# Patient Record
Sex: FEMALE | Employment: FULL TIME | URBAN - METROPOLITAN AREA
[De-identification: names, ages, dates, MRNs, and addresses within clinical notes are randomized per-mention and may not be internally consistent; named-entity substitution may affect disease eponyms.]

---

## 2017-09-13 ENCOUNTER — HOSPITAL ENCOUNTER (OUTPATIENT)
Dept: PHYSICAL THERAPY | Age: 47
Discharge: HOME | End: 2017-09-13
Payer: COMMERCIAL

## 2017-09-13 PROCEDURE — 97112 NEUROMUSCULAR REEDUCATION: CPT

## 2017-09-13 PROCEDURE — 97162 PT EVAL MOD COMPLEX 30 MIN: CPT

## 2017-09-13 NOTE — PROGRESS NOTES
PELVIC FLOOR DAILY TREATMENT NOTE 8-    Patient Name: Roger Kirkpatrick  Date:2017  : 1970  [x]  Patient  Verified  Payor: BLUE CROSS / Plan:  [facility] / Product Type: PPO /    In time:8:28  Out time:9:23  Total Treatment Time (min): 55    Visit #: 1 of 8    Treatment Area: Unspecified dyspareunia [N94.10]    SUBJECTIVE  Pain Level (0-10 scale): 0  Any medication changes, allergies to medications, adverse drug reactions, diagnosis change, or new procedure performed?: [x] No    [] Yes (see summary sheet for update)  Subjective functional status/changes:   [] No changes reported  See medical history    OBJECTIVE            10 min Patient Education: [x] Review HEP    [] Progressed/Changed HEP based on: Educated Pt in pelvic floor anatomy, function/dysfunction and correct execution of a pelvic floor contraction. Reviewed biofeedback results. [] positioning   [] body mechanics   [] transfers   [] heat/ice application        Pain Level (0-10 scale) post treatment: 0    ASSESSMENT/Changes in Function: Justification for Eval Code Complexity:  Patient History : chronic, hysteredctomy, back pain  Examination see exam   Clinical Presentation: evolving  Clinical Decision Making : FOTO : 47 /100      Patient will continue to benefit from skilled PT services to modify and progress therapeutic interventions, address strength deficits, analyze and address soft tissue restrictions, instruct in home and community integration and address UUI, constipation to attain remaining goals. [x]  See Plan of Care  []  See progress note/recertification  []  See Discharge Summary         Progress towards goals / Updated goals:  Initial evaluation completed with home exercise program and education initiated.     PLAN  [x]  Upgrade activities as tolerated     []  Continue plan of care  []  Update interventions per flow sheet       []  Discharge due to:_  []  Other:_      Latesha Durham, PT 2017  8:28 AM      No future appointments.

## 2017-09-13 NOTE — PROGRESS NOTES
[address] [facility] PHYSICAL THERAPY AT [address], [address], [address] - Phone: [phone]  Fax: [phone] / [facility]  Patient Name: Sonia Rosenberg : 1970   Medical   Diagnosis: Unspecified dyspareunia [N94.10] Treatment Diagnosis: Unspecified dyspareunia [N94.10]  Urge incontinence (N39.41)  Constipation(K59.00)   Onset Date:      Referral Source: Delroy Jensen MD [facility]): 2017   Prior Hospitalization: See medical history Provider #: [id number]   Prior Level of Function: Chronic symptoms   Comorbidities: Hysterectomy, Back pain, PCOS    Medications: Verified on Patient Summary List   The Plan of Care and following information is based on the information from the initial evaluation.   ==================================================================================  Assessment / key information: Patient is a 41 y.o. yo female G0 who presents to [facility] with diagnosis of Unspecified dyspareunia [N94.10]. Patient reports urge urinary incontinence occurring multiple times daily, inability to delay urination at all with urgency, nocturia 1x nightly and constipation with decreased frequency of bowel movements at 2x weekly with consistent straining. She needs to take laxatives to have a bowel movement. Patient presents to PT with impaired strength of pelvic floor muscles scoring 25/35 on PERF. On biofeedback there was low net rise of fast twitch contraction at 8.56 microvolts and low net rise of slow twitch contraction at 5.58 microvolts. Resting tone of pelvic floor was elevated at 9.18 microvolts. Patient scored 47 on FOTO/Urinary problem indicating decreased quality of life.   Patient can benefit from PT for retraining of muscle control and relaxation on biofeedback to increase pelvic floor muscle strength, decrease urinary incontinence, urgency and nocturia, increase frequency of bowel movements. .    ==================================================================================  Eval Complexity: History: HIGH Complexity :3+ comorbidities / personal factors will impact the outcome/ POC Exam:MEDIUM Complexity : 3 Standardized tests and measures addressing body structure, function, activity limitation and / or participation in recreation  Presentation: MEDIUM Complexity : Evolving with changing characteristics  Clinical Decision Making:MEDIUM Complexity : FOTO score of 26-74Overall Complexity:MEDIUM  Problem List: Pelvic pain/dysfunction, Decreased pelvic floor mm awareness, Decreased pelvic floor mm strength, Hypertonus of pelvic floor, Urinary urgency and Other  Treatment Plan may include any combination of the following: Therapeutic exercise, Urge suppression techniques, Neuromuscular re-education, Manual therapy, Physical agent/modality, Patient education and Other  Patient / Family readiness to learn indicated by: asking questions, trying to perform skills and interest  Persons(s) to be included in education: patient (P)  Barriers to Learning/Limitations: None  Measures taken:    Patient Goal (s): \"Reduced bladder leakage and urgency\"   Patient self reported health status: good  Rehabilitation Potential: good  Short Term Goals: To be accomplished in 4 weeks:   1. Patient performing pelvic floor exercises TID. 2. Patient will report 20% subjective improvement in urinary incontinence with ADLs. 3. Increase net rise of fast twitch contraction to 10 microvolts to increase continence. 4. Patient using urge suppression8 and normal toileting techniques. Long Term Goals: To be accomplished in 8 weeks:   1. Patient independent in HEP     2. Patient will increase sore on FOTO/Urinary problem to 57 indicating improved continence and quality of life. 3. Increase net rise of fast twitch contraction to 12 microvolts to increase continence.      4. Patient reports bowel movement frequency increased to 3x weekly. Frequency / Duration:   Patient to be seen  1  times per week for 8  weeks:  Patient / Caregiver education and instruction:Exercises and Other Toileting techniques  G-Codes (GP): himanshu    Therapist Signature: Pk Lawrence PT Date: 4/81/8770   Certification Period: na Time: 9:27 AM   ==================================================================================  I certify that the above Physical Therapy Services are being furnished while the patient is under my care. I agree with the treatment plan and certify that this therapy is necessary. Physician Signature:        Date:       Time:     Please [facility] and [facility] or you may fax the signed copy to [phone]. Thank you.

## 2017-10-04 ENCOUNTER — HOSPITAL ENCOUNTER (OUTPATIENT)
Dept: PHYSICAL THERAPY | Age: 47
Discharge: HOME | End: 2017-10-04
Payer: COMMERCIAL

## 2017-10-04 PROCEDURE — 97112 NEUROMUSCULAR REEDUCATION: CPT

## 2017-10-04 PROCEDURE — 97140 MANUAL THERAPY 1/> REGIONS: CPT

## 2017-10-04 NOTE — PROGRESS NOTES
PELVIC FLOOR DAILY TREATMENT NOTE 8-14    Patient Name: Beena Valencia  Date:10/4/2017  : 1970  [x]  Patient  Verified  Payor: BLUE CROSS / Plan: [address] [place] [address] [employer] / Product Type: PPO /    In time:7:32  Out time:8:20  Total Treatment Time (min): 48     Visit #: 2 of 8    Treatment Area: Unspecified dyspareunia [N94.10]    SUBJECTIVE  Pain Level (0-10 scale): 0  Any medication changes, allergies to medications, adverse drug reactions, diagnosis change, or new procedure performed?: [x] No    [] Yes (see summary sheet for update)  Subjective functional status/changes:   [] No changes reported  Doing HEP 2x day in sitting. Leaking is better. To have a colonoscopy next week due to continued constipation. OBJECTIVE  Modality rationale: Neuromuscular reeducation to improve the patients ability to have a bowel movement and to decrease urinary incontinence, urgency and nocturia. Min Type Additional Details   38 [x] Biofeedback x 38 minutes    supine vaginal    [] Estim: []Att   []Unatt        []TENS instruct                  []IFC  []Premod   []NMES                     []Other:  []w/US   []w/ice   []w/heat  Position:  Location:    []  Traction: [] Cervical       []Lumbar                       [] Prone          []Supine                       []Intermittent   []Continuous Lbs:  [] before manual  [] after manual    []  Ultrasound: []Continuous   [] Pulsed                           []1MHz   []3MHz Location:  W/cm2:    []  Iontophoresis with dexamethasone         Location: [] Take home patch   [] In clinic    []  Ice     []  heat  []  Ice massage Position:  Location:    []  Vasopneumatic Device Pressure:       [] lo [] med [] hi   Temperature: [] lo [] med [] hi   [x] Skin assessment post-treatment:  [x]intact []redness- no adverse reaction       []redness  adverse reaction:       10 min Manual Therapy: Release/stroking to bilateral pubococcygeus mm with vaginal stretching.    Rationale: Increase tissue extensibility to improve patient's ability to engage in sexual intercourse and undergo a gynecological exam.              min Patient Education: [x] Review HEP    [] Progressed/Changed HEP based on: Increase slow twitch to   [] positioning   [] body mechanics   [] transfers   [] heat/ice application        Other Objective/Functional Measures:    [x]baseline resting tone: 7.0   [x]slow twitch mms 15.4(7.58)   [x]fast twitch mms15.3(6.64)    Pain Level (0-10 scale) post treatment: 0    ASSESSMENT/Changes in Function: Decreased resting tone of pelvic floor muscles with increased net rise of slow twitch contractions. Patient will continue to benefit from skilled PT services to modify and progress therapeutic interventions, address strength deficits, analyze and address soft tissue restrictions, instruct in home and community integration and address UUI, constipation and nocturia. to attain remaining goals. []  See Plan of Care  []  See progress note/recertification  []  See Discharge Summary         Progress towards goals / Updated goals:                            1. Patient performing pelvic floor exercises TID. 2. Patient will report 20% subjective improvement in urinary incontinence with ADLs. 3. Increase net rise of fast twitch contraction to 10 microvolts to increase continence.                            4. Patient using urge suppression8 and normal toileting techniques  PLAN  [x]  Upgrade activities as tolerated     []  Continue plan of care  []  Update interventions per flow sheet       []  Discharge due to:_  []  Other:_      Latricia Briggs PT 10/4/2017  7:32 AM      Future Appointments  Date Time Provider Checo Casillas   10/13/2017 7:30 AM Latricia Briggs PT [facility]   10/20/2017 7:30 AM Latricia Briggs PT [facility]   10/27/2017 7:30 AM Latricia Briggs PT [facility]   11/3/2017 7:30 AM Latricia Briggs PT [facility]   11/10/2017 7:30 DAIJA Blum, PT [facility]   11/17/2017 7:30 DAIJA Blum, PT [facility]

## 2017-10-06 ENCOUNTER — APPOINTMENT (OUTPATIENT)
Dept: PHYSICAL THERAPY | Age: 47
End: 2017-10-06
Payer: COMMERCIAL

## 2017-10-13 ENCOUNTER — HOSPITAL ENCOUNTER (OUTPATIENT)
Dept: PHYSICAL THERAPY | Age: 47
Discharge: HOME | End: 2017-10-13
Payer: COMMERCIAL

## 2017-10-13 PROCEDURE — 97140 MANUAL THERAPY 1/> REGIONS: CPT

## 2017-10-13 PROCEDURE — 97110 THERAPEUTIC EXERCISES: CPT

## 2017-10-13 PROCEDURE — 97112 NEUROMUSCULAR REEDUCATION: CPT

## 2017-10-13 NOTE — PROGRESS NOTES
[address] PHYSICAL THERAPY AT [facility] [address], [address] [facility], [place] [address] - Phone: [phone]  Fax: [phone]  PROGRESS NOTE  Patient Name: Amrit Hilario : 1970   Treatment/Medical Diagnosis: Unspecified dyspareunia [N94.10]   Referral Source: Liya Beard MD     Date of Initial Visit: 2 Attended Visits: 3 Missed Visits: 0   SUMMARY OF TREATMENT  PT has consisted of pelvic floor relaxation/strengthening via biofeedback, education as to  pelvic floor anatomy and function , manual therapy and home exercise program.   CURRENT STATUS  Patient has made good progress in PT with short term goals either met or progressing. Functional progress  Includes patient reporting 60% improvement in urinary leaking and urgency. Patient has been slow to strengthen the pelvic floor. Goal/Measure of Progress Goal Met? 1. Patient performing pelvic floor exercises 3x day   Status at last Eval: na Current Status: 3x day yes   2. Patient will report 20% improvement in urinary incontinence with ADLS. Status at last Eval: na Current Status: 60% yes   3. Increase net rise of fast twitch contraction to 10 microvolts to increase continence. 4. Patient using urge suppression8 and normal toileting techniques   Status at last Eval: 8.56  na Current Status: 9.13  Ongoing Progressing  progressing   New Goals to be achieved in __4__  weeks:                           1. Patient independent in HEP                             2. Patient will increase sore on FOTO/Urinary problem to 57 indicating improved continence and quality of life. 3. Increase net rise of fast twitch contraction to 11 microvolts to increase continence. 4. Patient reports bowel movement frequency increased to 3x weekly  RECOMMENDATIONS  Continue pelvic floor PT 1x week for 4 weeks.    If you have any questions/comments please contact us directly at [phone]. Thank you for allowing us to assist in the care of your patient. Therapist Signature: Justen Cortes PT Date: 10/13/2017     Time: 7:33 AM   NOTE TO PHYSICIAN:  PLEASE COMPLETE THE ORDERS BELOW AND FAX TO   [facility]: [phone]. If you are unable to process this request in 24 hours please contact our office: [phone].    ___ I have read the above report and request that my patient continue as recommended.   ___ I have read the above report and request that my patient continue therapy with the following changes/special instructions:_________________________________________________________   ___ I have read the above report and request that my patient be discharged from therapy.      Physician Signature:        Date:       Time:

## 2017-10-13 NOTE — PROGRESS NOTES
PELVIC FLOOR DAILY TREATMENT NOTE 8-14    Patient Name: Ольга Judd  Date:10/13/2017  : 1970  [x]  Patient  Verified  Payor: BLUE CROSS / Plan: [facility] / Product Type: PPO /    In time:7:37  Out time:8:15  Total Treatment Time (min): 38    Visit #: 3 of 8    Treatment Area: Unspecified dyspareunia [N94.10]    SUBJECTIVE  Pain Level (0-10 scale): 0  Any medication changes, allergies to medications, adverse drug reactions, diagnosis change, or new procedure performed?: [x] No    [] Yes (see summary sheet for update)  Subjective functional status/changes:   [] No changes reported  Had colonoscopy. Small polyp and hemmorhoids. HEP 3x day most days. 60% improved re UI and urgency.     OBJECTIVE  Modality rationale: Neuromuscular reeducation to improve the patients ability to have a bowel movement and to decrease urinary incontinence, urgency and nocturia   Min Type Additional Details   18 [x] Biofeedback x 18 minutes    supine vaginal    [] Estim: []Att   []Unatt        []TENS instruct                  []IFC  []Premod   []NMES                     []Other:  []w/US   []w/ice   []w/heat  Position:  Location:    []  Traction: [] Cervical       []Lumbar                       [] Prone          []Supine                       []Intermittent   []Continuous Lbs:  [] before manual  [] after manual    []  Ultrasound: []Continuous   [] Pulsed                           []1MHz   []3MHz Location:  W/cm2:    []  Iontophoresis with dexamethasone         Location: [] Take home patch   [] In clinic    []  Ice     []  heat  []  Ice massage Position:  Location:    []  Vasopneumatic Device Pressure:       [] lo [] med [] hi   Temperature: [] lo [] med [] hi   [x] Skin assessment post-treatment:  [x]intact []redness- no adverse reaction       []redness  adverse reaction:     10 min Therapeutic Exercise:  [] See flow sheet :  []  Pelvic floor strengthening                []  Pelvic floor downtraining  [] Quality pelvic floor contractions      []  Relaxation techniques  [x]  Urge suppression exercises  []  Other:    Rationale: To improve the patients ability to delay urination. 10 min Manual Therapy: Release/stroking to bilateral pubococcygeus mm and R illiococcygeus with vaginal stretching. Rationale: Increase tissue extensibility to improve patient's ability to engage in sexual intercourse and undergo a gynecological exam.                  min Patient Education: [x] Review HEP    [] Progressed/Changed HEP based on:   [] positioning   [] body mechanics   [] transfers   [] heat/ice application        Other Objective/Functional Measures:    [x]baseline resting tone: 3.9   [x]slow twitch mms 17. 9(9.44)   [x]fast twitch mms18.7(9.13)    Pain Level (0-10 scale) post treatment: 0    ASSESSMENT/Changes in Function: Patient demonstrates improved resting tone of pelvic floor  And increased net rise of both fast and slow contractions muscles with improved continence. Patient will continue to benefit from skilled PT services to modify and progress therapeutic interventions, address strength deficits, analyze and address soft tissue restrictions, instruct in home and community integration and address UUI, constipation and nocturia.  to attain remaining goals.         []  See Plan of Care  [x]  See progress note/recertification  []  See Discharge Summary         Progress towards goals / Updated goals:  See PN    PLAN  [x]  Upgrade activities as tolerated     []  Continue plan of care  []  Update interventions per flow sheet       []  Discharge due to:_  []  Other:_      Latricia Briggs PT 10/13/2017  7:37 AM      Future Appointments  Date Time Provider Checo Casillas   10/20/2017 7:30 AM Latricia Briggs PT [facility]   10/27/2017 7:30 AM Latricai Briggs PT [facility]   11/3/2017 7:30 AM Latricia Briggs PT [facility]   11/10/2017 7:30 AM Latricia Briggs PT [facility]   11/17/2017 7:30 AM Latricia Briggs, PT [facility]

## 2017-10-20 ENCOUNTER — HOSPITAL ENCOUNTER (OUTPATIENT)
Dept: PHYSICAL THERAPY | Age: 47
Discharge: HOME | End: 2017-10-20
Payer: COMMERCIAL

## 2017-10-20 PROCEDURE — 97112 NEUROMUSCULAR REEDUCATION: CPT

## 2017-10-20 PROCEDURE — 97140 MANUAL THERAPY 1/> REGIONS: CPT

## 2017-10-20 PROCEDURE — 97110 THERAPEUTIC EXERCISES: CPT

## 2017-10-20 NOTE — PROGRESS NOTES
PELVIC FLOOR DAILY TREATMENT NOTE 8-    Patient Name: Amrit Hilario  Date:10/20/2017  : 1970  [x]  Patient  Verified  Payor: BLUE CROSS / Plan: [facility] / Product Type: PPO /    In time:7:35  Out time:8:19  Total Treatment Time (min): 44    Visit #: 4 of 8    Treatment Area: Unspecified dyspareunia [N94.10]    SUBJECTIVE  Pain Level (0-10 scale): 0  Any medication changes, allergies to medications, adverse drug reactions, diagnosis change, or new procedure performed?: [x] No    [] Yes (see summary sheet for update)  Subjective functional status/changes:   [] No changes reported  Not feeling muscles like she did the week before. Went down on estradiol cream.  Hasn't had much urgency. Hasn't been getting up as much at night.   Having intermittent LBP  OBJECTIVE  Modality rationale: Neuromuscular reeducation to improve the patients ability to have a bowel movement and to decrease urinary incontinence, urgency and nocturia   Min Type Additional Details   24 [x] Biofeedback x 24 minutes    supine vaginal    [] Estim: []Att   []Unatt        []TENS instruct                  []IFC  []Premod   []NMES                     []Other:  []w/US   []w/ice   []w/heat  Position:  Location:    []  Traction: [] Cervical       []Lumbar                       [] Prone          []Supine                       []Intermittent   []Continuous Lbs:  [] before manual  [] after manual    []  Ultrasound: []Continuous   [] Pulsed                           []1MHz   []3MHz Location:  W/cm2:    []  Iontophoresis with dexamethasone         Location: [] Take home patch   [] In clinic    []  Ice     []  heat  []  Ice massage Position:  Location:    []  Vasopneumatic Device Pressure:       [] lo [] med [] hi   Temperature: [] lo [] med [] hi   [x] Skin assessment post-treatment:  [x]intact []redness- no adverse reaction       []redness  adverse reaction:     10 min Therapeutic Exercise:  [x] See flow sheet :  []  Pelvic floor strengthening                []  Pelvic floor downtraining  []  Quality pelvic floor contractions      []  Relaxation techniques  []  Urge suppression exercises  [x]  Other:  Core strengthening    Rationale: increase strength of accessory muscles to improve the patients continence. 10 min Manual Therapy: Thieles massage rectally. No tenderness but severe weakness with EAS 1/5, puborectalis 0/5. Mliss Chand Rationale: Increase tissue extensibility to improve patient's ability to have a bowel movement.              min Patient Education: [x] Review HEP    [] Progressed/Changed HEP based on: Core exercises 3x week  [] positioning   [] body mechanics   [] transfers   [] heat/ice application        Other Objective/Functional Measures:    [x]baseline resting tone: 5   [x]slow twitch mms 19.6(10.3)   [x]fast twitch mms19.9(9.15)    Pain Level (0-10 scale) post treatment: 0    ASSESSMENT/Changes in Function: Severe pelvic floor weakness rectally. Patient will continue to benefit from skilled PT services to modify and progress therapeutic interventions, address strength deficits, analyze and address soft tissue restrictions, instruct in home and community integration and address UUI, constipation and nocturia. to attain remaining goals.         []  See Plan of Care  []  See progress note/recertification  []  See Discharge Summary         Progress towards goals / Updated goals:                          1. Patient independent in HEP. progressing                             2. Patient will increase sore on FOTO/Urinary problem to 57 indicating improved continence and quality                          of life.                           3.  Increase net rise of fast twitch contraction to 11 microvolts to increase continence.                             4. Patient reports bowel movement frequency increased to 3x weekly    PLAN  [x]  Upgrade activities as tolerated     []  Continue plan of care  []  Update interventions per flow sheet       []  Discharge due to:_  []  Other:_      Marco Kovacs, PT 10/20/2017  7:38 AM      Future Appointments  Date Time Provider Checo Casillas   10/27/2017 7:30 AM Marco Kovacs, PT [facility]   11/3/2017 7:30 AM Marco Kovacs, PT [facility]   11/10/2017 7:30 AM Marco Kovacs, PT [facility]   11/17/2017 7:30 AM Marco Kovacs, PT [facility]

## 2017-10-27 ENCOUNTER — APPOINTMENT (OUTPATIENT)
Dept: PHYSICAL THERAPY | Age: 47
End: 2017-10-27
Payer: COMMERCIAL

## 2017-11-03 ENCOUNTER — HOSPITAL ENCOUNTER (OUTPATIENT)
Dept: PHYSICAL THERAPY | Age: 47
Discharge: HOME | End: 2017-11-03
Payer: COMMERCIAL

## 2017-11-03 PROCEDURE — 97014 ELECTRIC STIMULATION THERAPY: CPT

## 2017-11-03 PROCEDURE — 97112 NEUROMUSCULAR REEDUCATION: CPT

## 2017-11-03 PROCEDURE — 97110 THERAPEUTIC EXERCISES: CPT

## 2017-11-03 NOTE — PROGRESS NOTES
PELVIC FLOOR DAILY TREATMENT NOTE 8-    Patient Name: Anthony Blas  Date:11/3/2017  : 1970  [x]  Patient  Verified  Payor: BLUE CROSS / Plan: [employer] [facility] / Product Type: PPO /    In time:7:45  Out time:8:30  Total Treatment Time (min): 45    Visit #: 5 of 8    Treatment Area: Unspecified dyspareunia [N94.10]    SUBJECTIVE  Pain Level (0-10 scale): 0  Any medication changes, allergies to medications, adverse drug reactions, diagnosis change, or new procedure performed?: [x] No    [] Yes (see summary sheet for update)  Subjective functional status/changes:   [] No changes reported  Not as much leaking but still has urgency when at work in the afternoon. Goes every hour in afternoon. Trying to do urge suppression.     OBJECTIVE  Modality rationale: Neuromuscular reeducation to improve the patients ability to have a bowel movement and to decrease urinary incontinence, urgency and nocturia   Min Type Additional Details   25 [x] Biofeedback x 25 minutes    sit surface   10 [x] Estim: []Att   []Unatt        []TENS instruct                  []IFC  []Premod   [x]NMES 50 Hz 5/10                    []Other:  []w/US   []w/ice   []w/heat  Position:  Location:    []  Traction: [] Cervical       []Lumbar                       [] Prone          []Supine                       []Intermittent   []Continuous Lbs:  [] before manual  [] after manual    []  Ultrasound: []Continuous   [] Pulsed                           []1MHz   []3MHz Location:  W/cm2:    []  Iontophoresis with dexamethasone         Location: [] Take home patch   [] In clinic    []  Ice     []  heat  []  Ice massage Position:  Location:    []  Vasopneumatic Device Pressure:       [] lo [] med [] hi   Temperature: [] lo [] med [] hi   [x] Skin assessment post-treatment:  [x]intact []redness- no adverse reaction       []redness  adverse reaction:   10 min Therapeutic Exercise:  [x] See flow sheet :  []  Pelvic floor strengthening []  Pelvic floor downtraining  []  Quality pelvic floor contractions      []  Relaxation techniques  []  Urge suppression exercises  [x]  Other:  Core strengthening                         Rationale: increase strength of accessory muscles to improve the patients continence             min Patient Education: [x] Review HEP    [] Progressed/Changed HEP based on: Continue urge suppression techniques trying to delay to second urge. Add side leg circles and prone SLR. [] positioning   [] body mechanics   [] transfers   [] heat/ice application        Other Objective/Functional Measures:    [x]baseline resting tone: -   [x]slow twitch mms 10. 1(6.27)   [x]fast twitch mms15.2(9.09)    Pain Level (0-10 scale) post treatment: 0    ASSESSMENT/Changes in Function: Improved continence but continued urgency and decreased PF muscle strength. Added PF NMES secondary to patient slow to strengthen. Patient will continue to benefit from skilled PT services to modify and progress therapeutic interventions, address strength deficits, analyze and address soft tissue restrictions, instruct in home and community integration and address UUI, constipation and nocturia. to attain remaining goals. [id number]  []  See Plan of Care  []  See progress note/recertification  []  See Discharge Summary      Progress towards goals / Updated goals:                          1. Patient independent in HEP. progressing                             2. Patient will increase sore on FOTO/Urinary problem to 57 indicating improved continence and quality                          of life.                           3.  Increase net rise of fast twitch contraction to 11 microvolts to increase continence.  not met                           4. Patient reports bowel movement frequency increased to 3x weekly    PLAN  [x]  Upgrade activities as tolerated     []  Continue plan of care  []  Update interventions per flow sheet       []  Discharge due to:_  []  Other:_ Yasmany Grande, PT 11/3/2017  7:47 AM      Future Appointments  Date Time Provider Checo Casillas   11/10/2017 2:15 PM Yasmany Grande, PT [facility]   11/17/2017 7:30 AM Yasmany Grande, PT [facility]   11/30/2017 5:45 PM Yasmany Grande, PT [facility]

## 2017-11-08 ENCOUNTER — HOSPITAL ENCOUNTER (OUTPATIENT)
Dept: PHYSICAL THERAPY | Age: 47
Discharge: HOME | End: 2017-11-08
Payer: COMMERCIAL

## 2017-11-08 PROCEDURE — 97014 ELECTRIC STIMULATION THERAPY: CPT

## 2017-11-08 PROCEDURE — 97110 THERAPEUTIC EXERCISES: CPT

## 2017-11-08 PROCEDURE — 97112 NEUROMUSCULAR REEDUCATION: CPT

## 2017-11-08 NOTE — PROGRESS NOTES
PELVIC FLOOR DAILY TREATMENT NOTE     Patient Name: Sharon Dus  Date:2017  : 1970  [x]  Patient  Verified  Payor: BLUE CROSS / Plan: [facility] / Product Type: PPO /    In time:8:17  Out time:9:17  Total Treatment Time (min): 50    Visit #: 6 of 8    Treatment Area: Unspecified dyspareunia [N94.10]    SUBJECTIVE  Pain Level (0-10 scale): 0  Any medication changes, allergies to medications, adverse drug reactions, diagnosis change, or new procedure performed?: [x] No    [] Yes (see summary sheet for update)  Subjective functional status/changes:   [] No changes reported  Able to delay urination up to 30 minutes while at work. Bowel movements every 3 days but no urge to go. .      OBJECTIVE  Modality rationale: Neuromuscular reeducation to improve the patients ability to have a bowel movement and to decrease urinary incontinence, urgency and nocturia   Min Type Additional Details   30 [x] Biofeedback x 30 minutes    sit surface and toileting techniques    10 [x] Estim: []Att   [x]Unatt        []TENS instruct                  []IFC  []Premod   [x]NMES 50 Hz 5/10                    []Other:  []w/US   []w/ice   []w/heat  Position: supine  Location: vaginal    []  Traction: [] Cervical       []Lumbar                       [] Prone          []Supine                       []Intermittent   []Continuous Lbs:  [] before manual  [] after manual    []  Ultrasound: []Continuous   [] Pulsed                           []1MHz   []3MHz Location:  W/cm2:    []  Iontophoresis with dexamethasone         Location: [] Take home patch   [] In clinic    []  Ice     []  heat  []  Ice massage Position:  Location:    []  Vasopneumatic Device Pressure:       [] lo [] med [] hi   Temperature: [] lo [] med [] hi   [x] Skin assessment post-treatment:  [x]intact []redness- no adverse reaction       []redness  adverse reaction:   10 min Therapeutic Exercise:  [x] See flow sheet :  []  Pelvic floor strengthening                []  XCJTIF floor downtraining  []  Quality pelvic floor contractions      [x]  Relaxation techniques  []  Urge suppression exercises  []  Other:                          Rationale: Improve ease of bowel movements and frequency.            min Patient Education: [x] Review HEP    [] Progressed/Changed HEP based on: Add abdominal breathing. [] positioning   [] body mechanics   [] transfers   [] heat/ice application        Other Objective/Functional Measures:    []baseline resting tone: -   [x]slow twitch mms 15.2(11.1)   [x]fast twitch mms14.8(7.94)    Pain Level (0-10 scale) post treatment: 0    ASSESSMENT/Changes in Function: Improved ability to delay to second urge    Patient will continue to benefit from skilled PT services to modify and progress therapeutic interventions, address strength deficits, analyze and address soft tissue restrictions, instruct in home and community integration and address UUI, constipation and nocturia. to attain remaining goals. Mason Martin  []  See Plan of Care  []  See progress note/recertification  []  See Discharge Summary      Progress towards goals / Updated goals:                          1. Patient independent in [address].  progressing                             2. Patient will increase sore on FOTO/Urinary problem to 57 indicating improved continence and quality                          of life.                           3.  Increase net rise of fast twitch contraction to 11 microvolts to increase continence.  not met                           4. Patient reports bowel movement frequency increased to 3x weekly     PLAN  [x]  Upgrade activities as tolerated     []  Continue plan of care  []  Update interventions per flow sheet       []  Discharge due to:_  []  Other:_      Carmen Campos PT 11/8/2017  8:19 AM      Future Appointments  Date Time Provider Checo Casillas   11/17/2017 7:30 AM Carmen Campos PT [facility]   11/30/2017 5:45 PM CONNIE Jones [facility]

## 2017-11-10 ENCOUNTER — APPOINTMENT (OUTPATIENT)
Dept: PHYSICAL THERAPY | Age: 47
End: 2017-11-10
Payer: COMMERCIAL

## 2017-11-17 ENCOUNTER — HOSPITAL ENCOUNTER (OUTPATIENT)
Dept: PHYSICAL THERAPY | Age: 47
Discharge: HOME | End: 2017-11-17
Payer: COMMERCIAL

## 2017-11-17 PROCEDURE — 97014 ELECTRIC STIMULATION THERAPY: CPT

## 2017-11-17 PROCEDURE — 97112 NEUROMUSCULAR REEDUCATION: CPT

## 2017-11-17 NOTE — PROGRESS NOTES
PELVIC FLOOR DAILY TREATMENT NOTE     Patient Name: Shaye Bowden  Date:2017  : 1970  [x]  Patient  Verified  Payor: BLUE CROSS / Plan: [employer] [facility] / Product Type: PPO /    In time:7:34  Out time:8:22  Total Treatment Time (min): 48    Visit #: 7 of     Treatment Area: Unspecified dyspareunia [N94.10]    SUBJECTIVE  Pain Level (0-10 scale): 0  Any medication changes, allergies to medications, adverse drug reactions, diagnosis change, or new procedure performed?: [x] No    [] Yes (see summary sheet for update)  Subjective functional status/changes:   [] No changes reported  Bowel movements every 3-4 days but needs to take magnesium. Leaking better but not a good week this past week. Not getting up at night. 70-75% improved. Still  going frequently in PM at work, every hour. Only 1x in AM at work. .     OBJECTIVE  Modality rationale: Neuromuscular reeducation to improve the patients ability to have a bowel movement and to decrease urinary incontinence, urgency and nocturia   Min Type Additional Details   38 [x] Biofeedback x 38 minutes    sit and stand surface   10 [x] Estim: []Att   [x]Unatt        []TENS instruct                  []IFC  []Premod   [x]NMES 50 Hz 5/10                    []Other:  []w/US   []w/ice   []w/heat  Position:  Location:    []  Traction: [] Cervical       []Lumbar                       [] Prone          []Supine                       []Intermittent   []Continuous Lbs:  [] before manual  [] after manual    []  Ultrasound: []Continuous   [] Pulsed                           []1MHz   []3MHz Location:  W/cm2:    []  Iontophoresis with dexamethasone         Location: [] Take home patch   [] In clinic    []  Ice     []  heat  []  Ice massage Position:  Location:    []  Vasopneumatic Device Pressure:       [] lo [] med [] hi   Temperature: [] lo [] med [] hi   [x] Skin assessment post-treatment:  [x]intact []redness- no adverse reaction       []redness  adverse reaction:   10 min Therapeutic Exercise:  [x] See flow sheet :  []  Pelvic floor strengthening                []  Pelvic floor downtraining  []  Quality pelvic floor contractions      []  Relaxation techniques  []  Urge suppression exercises  [x]  Other:  Core strengthening                         Rationale: increase strength of accessory muscles to improve the patients continence              min Patient Education: [x] Review HEP    [] Progressed/Changed HEP based on:   [] positioning   [] body mechanics   [] transfers   [] heat/ice application        Other Objective/Functional Measures:    []baseline resting tone: -   [x]slow twitch mms 12(8.62)   [x]fast twitch mms14.6(9.33)    Pain Level (0-10 scale) post treatment: 0    ASSESSMENT/Changes in Function: See PN    Patient will continue to benefit from skilled PT services to modify and progress therapeutic interventions, address strength deficits, analyze and address soft tissue restrictions, instruct in home and community integration and address UUI, constipation and nocturia. to attain remaining goals.         []  See Plan of Care  [x]  See progress note/recertification  []  See Discharge Summary         Progress towards goals / Updated goals:  See PN    PLAN  [x]  Upgrade activities as tolerated     []  Continue plan of care  []  Update interventions per flow sheet       []  Discharge due to:_  []  Other:_      Latricia Briggs, PT 11/17/2017  7:34 AM      Future Appointments  Date Time Provider Checo Casillas   11/30/2017 5:45 PM Latricia Briggs, PT [facility]

## 2017-11-17 NOTE — PROGRESS NOTES
[address] PHYSICAL THERAPY AT [place]  [address] [facility] [place] [address] - Phone: [phone]  Fax: [phone]  PROGRESS NOTE  Patient Name: Vicenta Cook : 1970   Treatment/Medical Diagnosis: Unspecified dyspareunia [N94.10]   Referral Source: Diana Manzo MD     Date of Initial Visit: 2017 Attended Visits: 7 Missed Visits: 0   SUMMARY OF TREATMENT  PT has consisted of pelvic floor relaxation/strengthening via biofeedback, education as to  pelvic floor anatomy and function/toileting techniques , manual therapy and home exercise program.   CURRENT STATUS  Patient has made good progress in PT with long term goals partially met. Functional progress  Includes patient reporting 70-75%% improvement in urinary incontinence. She no longer has nocturiaPelvic floor muscle strength still impaired. Patient can benefit from continuing PT due to continued urgency and increased frequency of urination. Blanquita Underwood of Progress Goal Met? 1. Patient independent in HEP     Status at last Eval: progressing Current Status: progressinf progressing   2. Patient will increase sore on FOTO/Urinary problem to 57 indicating improved continence and quality of life. Status at last Eval: 47 Current Status: 58 yes   3. Increase net rise of fast twitch contraction to 11 microvolts to increase continence.     4. Patient reports bowel movement frequency increased to 3x weekly   Status at last Eval: 9.13  2x weekly Current Status: 9.33  2x weekly Yes  no   New Goals to be achieved in __4__  weeks:  1. Patient independent in home exercise program.   2.  Patient will report bowel movement frequency increased to 3x weekly. 3.  Increase net rise of fast twitch contraction to 11 microvolts to increase continence. G-Codes: na  RECOMMENDATIONS  Continue pelvic floor PT 1x week for 4 weeks.    If you have any questions/comments please contact us directly at [phone]. Thank you for allowing us to assist in the care of your patient. Therapist Signature: Angelica Drake PT Date: 11/17/2017     Time: 7:39 AM   NOTE TO PHYSICIAN:  PLEASE COMPLETE THE ORDERS BELOW AND FAX TO   [facility]: [phone]. If you are unable to process this request in 24 hours please contact our office: [phone].    ___ I have read the above report and request that my patient continue as recommended.   ___ I have read the above report and request that my patient continue therapy with the following changes/special instructions:_________________________________________________________   ___ I have read the above report and request that my patient be discharged from therapy.      Physician Signature:        Date:       Time:

## 2017-11-30 ENCOUNTER — APPOINTMENT (OUTPATIENT)
Dept: PHYSICAL THERAPY | Age: 47
End: 2017-11-30
Payer: COMMERCIAL

## 2017-12-01 ENCOUNTER — HOSPITAL ENCOUNTER (OUTPATIENT)
Dept: PHYSICAL THERAPY | Age: 47
Discharge: HOME | End: 2017-12-01
Payer: COMMERCIAL

## 2017-12-01 PROCEDURE — 97112 NEUROMUSCULAR REEDUCATION: CPT

## 2017-12-01 PROCEDURE — 97014 ELECTRIC STIMULATION THERAPY: CPT

## 2017-12-01 NOTE — PROGRESS NOTES
PELVIC FLOOR DAILY TREATMENT NOTE 8-14    Patient Name: Libby Painter  Date:2017  : 1970  [x]  Patient  Verified  Payor: BLUE CROSS / Plan: [employer] [facility] / Product Type: PPO /    In time:7:35  Out time:8:22  Total Treatment Time (min): 47    Visit #: 8 of 11    Treatment Area: Unspecified dyspareunia [N94.10]    SUBJECTIVE  Pain Level (0-10 scale): 0  Any medication changes, allergies to medications, adverse drug reactions, diagnosis change, or new procedure performed?: [x] No    [] Yes (see summary sheet for update)  Subjective functional status/changes:   [] No changes reported  Patient reports having 2 bowel movements this past week. Restarted Estrace. Had stopped it secondary to hair falling out but feels that it does help her muscles. Eats very little fruits and vegetables. 1 piece of fruit daily which is a banana.   OBJECTIVE  Modality rationale: Neuromuscular reeducation to improve the patients ability to have a bowel movement and to decrease urinary incontinence, urgency and nocturia   Min Type Additional Details   37 [x] Biofeedback x 37 minutes    sit surface and vaginal supine   10 [x] Estim: []Att   [x]Unatt        []TENS instruct                  []IFC  []Premod   [x]NMES 50 Hz 5/10                    []Other:  []w/US   []w/ice   []w/heat  Position: supine  Location: vaginal    []  Traction: [] Cervical       []Lumbar                       [] Prone          []Supine                       []Intermittent   []Continuous Lbs:  [] before manual  [] after manual    []  Ultrasound: []Continuous   [] Pulsed                           []1MHz   []3MHz Location:  W/cm2:    []  Iontophoresis with dexamethasone         Location: [] Take home patch   [] In clinic    []  Ice     []  heat  []  Ice massage Position:  Location:    []  Vasopneumatic Device Pressure:       [] lo [] med [] hi   Temperature: [] lo [] med [] hi   [x] Skin assessment post-treatment:  [x]intact []redness- no adverse reaction       []redness  adverse reaction:            min Patient Education: [x] Review HEP    [] Progressed/Changed HEP based on: Have 1 apple a day. [] positioning   [] body mechanics   [] transfers   [] heat/ice application        Other Objective/Functional Measures:    []baseline resting tone: -   [x]slow twitch mms 3.55(3.02)   [x]fast twitch mms9.01(5.87)    Pain Level (0-10 scale) post treatment: 0    ASSESSMENT/Changes in Function: Decreased pelvic floor muscle strength. Patient will continue to benefit from skilled PT services to modify and progress therapeutic interventions, address strength deficits, analyze and address soft tissue restrictions, instruct in home and community integration and address UUI, constipation and nocturia. to attain remaining goals.         []  See Plan of Care  []  See progress note/recertification  []  See Discharge Summary         Progress towards goals / Updated goals:    1. Patient independent in home exercise program.   2.  Patient will report bowel movement frequency increased to 3x weekly.    3.  Increase net rise of fast twitch contraction to 11 microvolts to increase continence     PLAN  [x]  Upgrade activities as tolerated     []  Continue plan of care  []  Update interventions per flow sheet       []  Discharge due to:_  []  Other:_      Meri Person, PT 12/1/2017  7:34 AM      Future Appointments  Date Time Provider Checo Casillas   12/8/2017 7:30 AM Meri Person PT [facility]   12/15/2017 7:30 AM Meri Person, PT [facility]   12/20/2017 7:30 AM Meri Person PT [facility]

## 2017-12-08 ENCOUNTER — APPOINTMENT (OUTPATIENT)
Dept: PHYSICAL THERAPY | Age: 47
End: 2017-12-08
Payer: COMMERCIAL

## 2017-12-15 ENCOUNTER — HOSPITAL ENCOUNTER (OUTPATIENT)
Dept: PHYSICAL THERAPY | Age: 47
Discharge: HOME | End: 2017-12-15
Payer: COMMERCIAL

## 2017-12-15 PROCEDURE — 97014 ELECTRIC STIMULATION THERAPY: CPT

## 2017-12-15 PROCEDURE — 97112 NEUROMUSCULAR REEDUCATION: CPT

## 2017-12-15 NOTE — PROGRESS NOTES
PELVIC FLOOR DAILY TREATMENT NOTE 8-14    Patient Name: Latricia Cordero  Date:12/15/2017  : 1970  [x]  Patient  Verified  Payor: BLUE CROSS / Plan: [employer] [facility] [place] / Product Type: PPO /    In time:7:42  Out time:8:30  Total Treatment Time (min): 48    Visit #: 9 of 11    Treatment Area: Unspecified dyspareunia [N94.10]    SUBJECTIVE  Pain Level (0-10 scale): 0  Any medication changes, allergies to medications, adverse drug reactions, diagnosis change, or new procedure performed?: [x] No    [] Yes (see summary sheet for update)  Subjective functional status/changes:   [] No changes reported  Patient reports less leaking but still has urgency. Bowel movements daily secondary to glucophage. Has a biofeedback machine at home but isn't sure where it is.     OBJECTIVE  Modality rationale: Neuromuscular reeducation to improve the patients ability to have a bowel movement and to decrease urinary incontinence, urgency and nocturia   Min Type Additional Details   38 [] Biofeedback x 38 minutes    sit and stand surface   10 [x] Estim: []Att   [x]Unatt        []TENS instruct                  []IFC  []Premod   [x]NMES  50 Hz 5/10                   []Other:  []w/US   []w/ice   []w/heat  Position: supine  Location: vaginal    []  Traction: [] Cervical       []Lumbar                       [] Prone          []Supine                       []Intermittent   []Continuous Lbs:  [] before manual  [] after manual    []  Ultrasound: []Continuous   [] Pulsed                           []1MHz   []3MHz Location:  W/cm2:    []  Iontophoresis with dexamethasone         Location: [] Take home patch   [] In clinic    []  Ice     []  heat  []  Ice massage Position:  Location:    []  Vasopneumatic Device Pressure:       [] lo [] med [] hi   Temperature: [] lo [] med [] hi   [x] Skin assessment post-treatment:  [x]intact []redness- no adverse reaction             min Patient Education: [x] Review HEP    [] Progressed/Changed HEP based on:   [] positioning   [] body mechanics   [] transfers   [] heat/ice application        Other Objective/Functional Measures: PF contraction 3/5   []baseline resting tone: -   [x]slow twitch mms 11.6((9.54)   [x]fast twitch mms15(9.45)    Pain Level (0-10 scale) post treatment: 0    ASSESSMENT/Changes in Function: Slowly improving PF muscle strength with improving continence. Patient will continue to benefit from skilled PT services to modify and progress therapeutic interventions, address strength deficits, analyze and address soft tissue restrictions, instruct in home and community integration and address UUI, constipation and nocturia. to attain remaining goals.         []  See Plan of Care  []  See progress note/recertification  []  See Discharge Summary         Progress towards goals / Updated goals:  1.  Patient independent in home exercise program.  progressing   2.  Patient will report bowel movement frequency increased to 3x weekly. met   3.  Increase net rise of fast twitch contraction to 11 microvolts to increase continence.   progressing         PLAN  [x]  Upgrade activities as tolerated     []  Continue plan of care  []  Update interventions per flow sheet       []  Discharge due to:_  []  Other:_      Balbir Aceves, PT 12/15/2017  7:38 AM      Future Appointments  Date Time Provider Checo Casillas   12/20/2017 7:30 AM Balbir Aceves PT [facility]

## 2017-12-20 ENCOUNTER — APPOINTMENT (OUTPATIENT)
Dept: PHYSICAL THERAPY | Age: 47
End: 2017-12-20
Payer: COMMERCIAL

## 2017-12-22 ENCOUNTER — APPOINTMENT (OUTPATIENT)
Dept: PHYSICAL THERAPY | Age: 47
End: 2017-12-22
Payer: COMMERCIAL

## 2018-01-11 ENCOUNTER — HOSPITAL ENCOUNTER (OUTPATIENT)
Dept: PHYSICAL THERAPY | Age: 48
End: 2018-01-11

## 2018-01-12 ENCOUNTER — APPOINTMENT (OUTPATIENT)
Dept: PHYSICAL THERAPY | Age: 48
End: 2018-01-12

## 2018-01-17 ENCOUNTER — APPOINTMENT (OUTPATIENT)
Dept: PHYSICAL THERAPY | Age: 48
End: 2018-01-17

## 2018-02-23 NOTE — PROGRESS NOTES
[address] PHYSICAL THERAPY AT [facility][1] [address], [address] [facility], [place]  Phone: [phone]  Fax: [phone] SUMMARY  Patient Name: Amrit Hilario : 1970   Treatment/Medical Diagnosis: Unspecified dyspareunia [N94.10]   Referral Source: Liya Beard MD     Date of Initial Visit: 2017 Attended Visits: 9 Missed Visits: 2     SUMMARY OF TREATMENT  PT has consisted of pelvic floor relaxation/strengthening via biofeedback, education as to pelvic floor anatomy and function/toileting techniques, manual therapy and home exercise program.     CURRENT STATUS  Patient completed 9 of 11 treatments then was unable to continue PT secondary to work schedule. When last seen she reported improved uirinary incontinence but was still having urinary urgency. Bowel movements are daily but patient felt that it was due in part to taking glucophage. She continued to have impaired pelvic floor muscle strength. Goal/Measure of Progress Goal Met? 1. Patient independent in HEP. Status at last Eval: progressing Current Status: progressing progressing   2. Patient will report bowel movement frequency increased to 3x weekly. Status at last Eval: 2x weekly Current Status: Daily yes   3. Increase net rise of fast twitch contraction to 11 microvolts to increase continence. Status at last Eval: 9.33 Current Status: 9.45 no     RECOMMENDATIONS  Discontinue therapy due to lack of attendance or compliance. .  Patient to continue on home exercise program.  If you have any questions/comments please contact us directly at [phone]. Thank you for allowing us to assist in the care of your patient. Therapist Signature:  Adry Nash PT Date: 12/15/2017     Time: 12:41 PM

## 2019-06-04 ENCOUNTER — HOSPITAL ENCOUNTER (OUTPATIENT)
Dept: PHYSICAL THERAPY | Age: 49
Discharge: HOME | End: 2019-06-04
Payer: COMMERCIAL

## 2019-06-04 PROCEDURE — 97161 PT EVAL LOW COMPLEX 20 MIN: CPT

## 2019-06-04 PROCEDURE — 97530 THERAPEUTIC ACTIVITIES: CPT

## 2019-06-04 NOTE — PROGRESS NOTES
Cristian Craig 33  [facility] PHYSICAL THERAPY AT [address], [address], [place] [address] - Phone: [phone]  Fax: [phone] / [address]  Patient Name: Sherrill Mojica : 1970   Medical   Diagnosis: Urinary incontinence [R32] Treatment Diagnosis: QUINTIN, urgency   Onset Date: Worsening within the past year, chronic     Referral Source: Evaristo Varner* Start of Care [facility]): 2019   Prior Hospitalization: See medical history Provider #: [id number]   Prior Level of Function: No urinary leakage, no need for pantiliner daily   Comorbidities: Thyroid problems, LBP, hysterectomy    Medications: Verified on Patient Summary List   The Plan of Care and following information is based on the information from the initial evaluation.   ==================================================================================  Assessment / key information: Patient is a 54 y.o. female  who presents to [facility] with diagnosis of Urinary incontinence [R32]. Patient reports having had success with pelvic floor PT in the past for incontinence, but she recently had increased stress and her symptoms have returned with bad voiding habits and cessation of HEP. Patient wears pantiliners daily for protection. Patient presents to PT with impaired strength of pelvic floor muscles scoring  TBD on PERF (patient deferred due to currently being sick). In standing, lumbar AROM was limited in each direction due to patient report of stifness, decreased SLS with EO to < 10 sec B, decreased length of quads, hamstrings, hip flexors B. Hip IR/ER WNL. Educated patient in diaphragmatic breathing, how to complete bladder/bowel diary, self-abdominal/bowel massage for constipation, and restorative yoga poses for stretching and relaxation. Patient scored 55 on FOTO/Urinary Problem indicating decreased quality of life.   Patient can benefit from PT to increase pelvic floor muscle strength, decrease urinary incontinence, urgency and nocturia.    ==================================================================================  Eval Complexity: History: MEDIUM  Complexity : 1-2 comorbidities / personal factors will impact the outcome/ POC Exam:HIGH Complexity : 4+ Standardized tests and measures addressing body structure, function, activity limitation and / or participation in recreation  Presentation: LOW Complexity : Stable, uncomplicated  Clinical Decision Making:MEDIUM Complexity : FOTO score of 26-74Overall Complexity:LOW   Problem List: Pelvic pain/dysfunction, Decreased pelvic floor mm awareness, Decreased pelvic floor mm strength, Use of accessory muscles, Improper voiding habits, Hypertonus of pelvic floor and Urinary urgency  Treatment Plan may include any combination of the following: Therapeutic exercise, Urge suppression techniques, Neuromuscular re-education, Manual therapy, Physical agent/modality and Patient education  Patient / Family readiness to learn indicated by: asking questions, trying to perform skills and interest  Persons(s) to be included in education: patient (P)  Barriers to Learning/Limitations: None  Measures taken, if barriers to learning: NA   Patient Goal (s): \"improved bladder function\"   Patient self reported health status: good  Rehabilitation Potential: good  Short Term Goals: To be accomplished in 2-3 weeks:   1. Patient is compliant with all HEP instructions. 2. Patient will report > or = 25% subjective improvement in urinary incontinence with ADLs. 3. Assess rest and working tone of pelvic floor muscles via biofeedback and set goals accordingly. 4. Assess PERF score and set goals accordingly. Long Term Goals: To be accomplished in 8-12 weeks:   1. Patient independent in HEP in preparation for discharge.     2. Patient will increase score on FOTO/Urinary Problem to > or = 62 indicating improved continence and QOL. 3. Improve PERF score according to goals set within STGs and first 2 weeks. 4. Nocturia decreased to 0-1x nightly. Frequency / Duration:   Patient to be seen  1  times per week for 8-12  weeks:  Patient / Caregiver education and instruction:Proper Voiding Habits, Diet, Pain Management, Exercises and Bladder Retraining  Therapist Signature: Laurita Mario PT, DPT, MTC, CMTPT Date: 9/9/6630   Certification Period: na Time: 4:35 PM   ==================================================================================  I certify that the above Physical Therapy Services are being furnished while the patient is under my care. I agree with the treatment plan and certify that this therapy is necessary. Physician Signature:        Date:       Time:     Please sign and return to [facility] or you may fax the signed copy to [phone]. Thank you.

## 2019-06-04 NOTE — PROGRESS NOTES
PHYSICAL THERAPY - DAILY TREATMENT NOTE     Patient Name: Ольга Judd        Date: 2019  : 1970   YES Patient  Verified  Visit #:   1     Insurance: Payor: BLUE CROSS / Plan: [facility] / Product Type: PPO /      In time: 330 Out time: 420   Total Treatment Time: 50     Medicare/BCBS Time Tracking (below)   Total Timed Codes (min):  25 1:1 Treatment Time:  25     TREATMENT AREA =  Urinary incontinence [R32]    SUBJECTIVE    Pain Level (on 0 to 10 scale):  0  / 10   Medication Changes/New allergies or changes in medical history, any new surgeries or procedures? NO    If yes, update Summary List   Subjective Functional Status/Changes:  []  No changes reported       Functional improvements: see POC  Functional impairments: see POC         OBJECTIVE    25 min Therapeutic Activity: [x]  See flow sheet--bladder/bowel diary, breathing, restorative stretching for relaxation   Rationale:      increase ROM, increase strength, improve coordination and increase proprioception to improve the patients ability to perform ADLs     Billed With/As:   [] TE   [x] TA   [] Neuro   [] Self Care Patient Education: [x] Review HEP    [] Progressed/Changed HEP based on:   [] positioning   [] body mechanics   [] transfers   [] heat/ice application    [] other:        Other Objective/Functional Measures:    See POC. Post Treatment Pain Level (on 0 to 10) scale:   0  / 10     ASSESSMENT    Assessment/Changes in Function:     See POC.       []  See Progress Note/Recertification   Patient will continue to benefit from skilled PT services to modify and progress therapeutic interventions, address functional mobility deficits, address ROM deficits, address strength deficits, analyze and address soft tissue restrictions, analyze and cue movement patterns, analyze and modify body mechanics/ergonomics, assess and modify postural abnormalities and instruct in home and community integration to attain remaining goals.       Progress toward goals / Updated goals:    See POC     PLAN    [x]  Upgrade activities as tolerated YES Continue plan of care   []  Discharge due to :    []  Other:      Therapist: Mehrdad Roberts, PT, DPT, MTC, CMTPT    Date: 6/4/2019 Time: 4:32 PM     Future Appointments   Date Time Provider Checo Casillas   6/11/2019  3:15 PM Sandra Almonte PT [facility] AT [facility]   6/18/2019  3:15 PM Sandra Almonte PT [facility] AT [facility]   6/25/2019  3:15 PM Sandra Almonte PT [facility] AT [facility]

## 2019-06-11 ENCOUNTER — HOSPITAL ENCOUNTER (OUTPATIENT)
Dept: PHYSICAL THERAPY | Age: 49
Discharge: HOME | End: 2019-06-11
Payer: COMMERCIAL

## 2019-06-11 PROCEDURE — 97112 NEUROMUSCULAR REEDUCATION: CPT

## 2019-06-18 ENCOUNTER — APPOINTMENT (OUTPATIENT)
Dept: PHYSICAL THERAPY | Age: 49
End: 2019-06-18
Payer: COMMERCIAL

## 2019-06-25 ENCOUNTER — HOSPITAL ENCOUNTER (OUTPATIENT)
Dept: PHYSICAL THERAPY | Age: 49
Discharge: HOME | End: 2019-06-25
Payer: COMMERCIAL

## 2019-06-25 PROCEDURE — 97112 NEUROMUSCULAR REEDUCATION: CPT

## 2019-06-25 PROCEDURE — 97032 APPL MODALITY 1+ESTIM EA 15: CPT

## 2019-06-25 NOTE — PROGRESS NOTES
PF DAILY TREATMENT NOTE 3-    Patient Name: Kp Singh  Date:2019  : 1970  [x]  Patient  Verified  Payor: BLUE CROSS / Plan: [employer] [facility] / Product Type: PPO /    In time:325  Out time:405  Total Treatment Time (min): 40  Visit #: 3 of     Medicare/BCBS Only   Total Timed Codes (min):  40 1:1 Treatment Time:  40     Treatment Area: [x] Pelvic Floor     [] Other:    SUBJECTIVE  Pain Level (0-10 scale): 0  Any medication changes, allergies to medications, adverse drug reactions, diagnosis change, or new procedure performed?: [x] No    [] Yes (see summary sheet for update)  Subjective functional status/changes:   [] No changes reported    Functional improvement: found NMES device  Functional limitations: urinary leakage    OBJECTIVE    Modality rationale: increase muscle contraction/control to improve the patients ability to maintain continence   Min Type Additional Details    [] Estim:  []Unatt       []IFC  []Premod                        []Other:  []w/ice   []w/heat  Position:  Location:    [x] Estim: []Att    []TENS instruct  [x]NMES                    []Other:  []w/US   []w/ice   []w/heat  Position: supine, with knees bent  Location: via vaginal sensor    []  Ultrasound: []Continuous   [] Pulsed                           []1MHz   []3MHz Position:  Location:    []  Ice     []  heat  []  Ice massage  []  Laser   []  Anodyne Position:  Location:   [] Skin assessment post-treatment:  []intact []redness- no adverse reaction    []redness  adverse reaction:     15 min Neuromuscular Re-education:  [x]  See flow sheet :   [x]  Pelvic floor strengthening                 [x]  Pelvic floor downtraining  [x]  Quality pelvic floor contractions       [x]  Relaxation techniques  []  Urge suppression exercises  []  Other:  Rationale: increase strength, improve coordination and increase proprioception  to improve the patients ability to maintain continence with ADLs            With   [] TE [] TA   [x] neuro  [] manual   [] other: Patient Education: [x] Review HEP    [] Progressed/Changed HEP based on:   [] positioning   [] body mechanics   [] transfers   [] heat/ice application    [] other:      Other Objective/Functional Measures:     See chart for biofeedback graph via vaginal sensor. Elevated resting tone, significant fatigue nby the end of 20 reps, however patient had already performed 10 min e-stim program.   Issued vaginal sensor for use of estim at home. Pain Level (0-10 scale) post treatment: 0    ASSESSMENT/Changes in Function: improving compliance and strengthening/coordination  Difficulty with breathing coordination    [x]  Decrease # of leaks   [] No change [x]  Improving [] Resolved     []  Decrease hypertonus [] No change []  Improving [] Resolved     []  Increase void interval [] No change []  Improving [] Resolved     []  Increase PF strength [] No change []  Improving [] Resolved     [x]  Increase PF endurance [] No change [x]  Improving [] Resolved     []  Increase endurance [] No change []  Improving [] Resolved     []  Decrease # of pads [] No change []  Improving [] Resolved     []  Decrease pain [] No change []  Improving [] Resolved     []  Increased coordination [] No change []  Improving [] Resolved     []  Increased Bowel Frequency [] No change []  Improving [] Resolved       Patient will continue to benefit from skilled PT services to modify and progress therapeutic interventions, address functional mobility deficits, address ROM deficits, address strength deficits, analyze and address soft tissue restrictions, analyze and cue movement patterns, analyze and modify body mechanics/ergonomics, assess and modify postural abnormalities and instruct in home and community integration to attain remaining goals.      []  See Plan of Care  []  See progress note/recertification  []  See Discharge Summary         Progress towards goals / Updated goals:  improve PERF score to 3/6/10/10 with full relaxation in between --progressing  Patient independent in HEP in preparation for discharge. Patient will increase score on FOTO/Urinary Problem to > or = 62 indicating improved continence and QOL. Nocturia decreased to 0-1x nightly. --still going 2-3xnight    PLAN  [x]  Upgrade activities as tolerated     [x]  Continue plan of care  []  Update interventions per flow sheet       []  Discharge due to:_  []  Other:_      Alex Howell, PT 6/25/2019  3:40 PM    No future appointments.

## 2019-07-02 ENCOUNTER — HOSPITAL ENCOUNTER (OUTPATIENT)
Dept: PHYSICAL THERAPY | Age: 49
Discharge: HOME | End: 2019-07-02
Payer: COMMERCIAL

## 2019-07-02 PROCEDURE — 97110 THERAPEUTIC EXERCISES: CPT

## 2019-07-02 NOTE — PROGRESS NOTES
PF DAILY TREATMENT NOTE 3-16    Patient Name: Fercho York  Date:2019  : 1970  [x]  Patient  Verified  Payor: BLUE CROSS / Plan: [address] [place] [address] [place] / Product Type: PPO /    In time:150  Out time:230  Total Treatment Time (min): 40  Visit #: 4 of 8    Medicare/BCBS Only   Total Timed Codes (min):  40 1:1 Treatment Time:  40     Treatment Area: [x] Pelvic Floor     [] Other:    SUBJECTIVE  Pain Level (0-10 scale): 0  Any medication changes, allergies to medications, adverse drug reactions, diagnosis change, or new procedure performed?: [x] No    [] Yes (see summary sheet for update)  Subjective functional status/changes:   [] No changes reported    Functional improvement: doing HEP, practicing breathing  Functional limitations: urinary leakage, nocturia    OBJECTIVE    40 min Therapeutic Exercise:  [x] See flow sheet :   [x]  Pelvic floor strengthening                 []  Pelvic floor downtraining  [x]  Quality pelvic floor contractions       [x]  Relaxation techniques  [x]  Urge suppression exercises  [x]  Other: core strengthening  Rationale: increase ROM, increase strength, improve coordination and increase proprioception  to improve the patients ability to maintain continence          With   [x] TE   [] TA   [] neuro  [] manual   [] other: Patient Education: [x] Review HEP    [] Progressed/Changed HEP based on:   [] positioning   [] body mechanics   [] transfers   [] heat/ice application    [] other:      Other Objective/Functional Measures:   []Biofeedback  baseline resting tone:   slow twitch mms   fast twitch mms    []PERF:     Patient conitinues to demonstrate difficulty with diaphragmatic breathing pattern, requires max cuing and assist with hip ADD ball squeeze. Patient stated she has been under a high level of stress and has had a hard time making appointments midday.      Pain Level (0-10 scale) post treatment: 0    ASSESSMENT/Changes in Function: Patient has difficulty concentrating on therapy, well educated in HEP until she can make 4:00 appts. Patient will continue to benefit from skilled PT services to modify and progress therapeutic interventions, address functional mobility deficits, address ROM deficits, address strength deficits, analyze and address soft tissue restrictions, analyze and cue movement patterns, analyze and modify body mechanics/ergonomics, assess and modify postural abnormalities and instruct in home and community integration to attain remaining goals. []  See Plan of Care  []  See progress note/recertification  []  See Discharge Summary         Progress towards goals / Updated goals:  Progression of developed HEP to include core strengthening    PLAN  [x]  Upgrade activities as tolerated     [x]  Continue plan of care  []  Update interventions per flow sheet       []  Discharge due to:_  []  Other:_      Danay Banks PT 7/2/2019  2:33 PM    No future appointments.

## 2019-07-09 ENCOUNTER — APPOINTMENT (OUTPATIENT)
Dept: PHYSICAL THERAPY | Age: 49
End: 2019-07-09
Payer: COMMERCIAL

## 2019-07-23 ENCOUNTER — APPOINTMENT (OUTPATIENT)
Dept: PHYSICAL THERAPY | Age: 49
End: 2019-07-23
Payer: COMMERCIAL

## 2019-08-06 ENCOUNTER — APPOINTMENT (OUTPATIENT)
Dept: PHYSICAL THERAPY | Age: 49
End: 2019-08-06

## 2021-06-29 NOTE — PROGRESS NOTES
PF DAILY TREATMENT NOTE 3    Patient Name: Criss Bah  Date:2019  : 1970  [x]  Patient  Verified  Payor: BLUE CROSS / Plan: [email or web address] [facility] / Product Type: PPO /    In time:315  Out time:4  Total Treatment Time (min): 45  Visit #: 2 of     Medicare/BCBS Only   Total Timed Codes (min):  45 1:1 Treatment Time:  45     Treatment Area: [x] Pelvic Floor     [] Other:    SUBJECTIVE  Pain Level (0-10 scale): 0  Any medication changes, allergies to medications, adverse drug reactions, diagnosis change, or new procedure performed?: [x] No    [] Yes (see summary sheet for update)  Subjective functional status/changes:   [] No changes reported    Functional improvement: noticed from bladder diary that most leakage occurs with full bladder  Functional limitations: urinary leakage, urgency    OBJECTIVE    45 min Neuromuscular Re-education:  [x]  See flow sheet :   []  Pelvic floor strengthening                 [x]  Pelvic floor downtraining  [x]  Quality pelvic floor contractions       [x]  Relaxation techniques  [x]  Urge suppression exercises  [x]  Other: diaphragmatic breathing, biofeedback assessment, PERF assessment  Rationale: increase strength, improve coordination and increase proprioception  to improve the patients ability to perform ADLs without leakage          With   [] TE   [] TA   [x] neuro  [] manual   [] other: Patient Education: [x] Review HEP    [] Progressed/Changed HEP based on:   [] positioning   [] body mechanics   [] transfers   [] heat/ice application    [] other:      Other Objective/Functional Measures:   [x]Biofeedback via perianal surface electrodes  baseline resting tone: 3.0 mV in hook lying  slow twitch mms up to 10-15 mV, but patient admits to compensation with glute squeeze  fast twitch mms not formally testing    [x]PERF: /10/6. Patient unable to relax fully between contractions.  Able to hold for 4, but no change noticed in between contractions    Pain Level (0-10 scale) post treatment: 0    ASSESSMENT/Changes in Function: patient filled out bladder diary and began thinking again about the pelvic floor connection with urge suppression    Patient will continue to benefit from skilled PT services to modify and progress therapeutic interventions, address functional mobility deficits, address ROM deficits, address strength deficits, analyze and address soft tissue restrictions, analyze and cue movement patterns, analyze and modify body mechanics/ergonomics, assess and modify postural abnormalities and instruct in home and community integration to attain remaining goals. []  See Plan of Care  []  See progress note/recertification  []  See Discharge Summary         Progress towards goals / Updated goals:  STGs 3 and 4 met.    LTG: improve PERF score to 3/6/10/10 with full relaxation in between     PLAN  [x]  Upgrade activities as tolerated     [x]  Continue plan of care  []  Update interventions per flow sheet       []  Discharge due to:_  []  Other:_      Rigo Toledo, PT 6/11/2019  4:14 PM    Future Appointments   Date Time Provider Checo Casillas   6/18/2019  3:15 PM Bernardo Powers, PT REH[facility] AT [facility]   6/25/2019  3:15 PM Bernardo Powers, PT REH[facility]ENTER AT [facility] ---